# Patient Record
Sex: MALE | Race: BLACK OR AFRICAN AMERICAN | NOT HISPANIC OR LATINO | Employment: FULL TIME | URBAN - NONMETROPOLITAN AREA
[De-identification: names, ages, dates, MRNs, and addresses within clinical notes are randomized per-mention and may not be internally consistent; named-entity substitution may affect disease eponyms.]

---

## 2019-02-05 ENCOUNTER — TRANSCRIBE ORDERS (OUTPATIENT)
Dept: ULTRASOUND IMAGING | Facility: HOSPITAL | Age: 44
End: 2019-02-05

## 2019-02-05 DIAGNOSIS — H53.121 TRANSIENT VISUAL LOSS, RIGHT EYE: Primary | ICD-10-CM

## 2019-02-07 ENCOUNTER — HOSPITAL ENCOUNTER (OUTPATIENT)
Dept: ULTRASOUND IMAGING | Facility: HOSPITAL | Age: 44
Discharge: HOME | End: 2019-02-07
Admitting: FAMILY MEDICINE

## 2019-02-07 DIAGNOSIS — H53.121 TRANSIENT VISUAL LOSS, RIGHT EYE: ICD-10-CM

## 2019-02-07 PROCEDURE — 93880 EXTRACRANIAL BILAT STUDY: CPT

## 2019-02-21 ENCOUNTER — OFFICE VISIT (OUTPATIENT)
Dept: UROLOGY | Facility: CLINIC | Age: 44
End: 2019-02-21

## 2019-02-21 VITALS
BODY MASS INDEX: 30.32 KG/M2 | DIASTOLIC BLOOD PRESSURE: 80 MMHG | HEART RATE: 73 BPM | OXYGEN SATURATION: 94 % | TEMPERATURE: 97.6 F | SYSTOLIC BLOOD PRESSURE: 130 MMHG | WEIGHT: 249 LBS | HEIGHT: 76 IN

## 2019-02-21 DIAGNOSIS — Z30.09 VASECTOMY EVALUATION: Primary | ICD-10-CM

## 2019-02-21 PROCEDURE — 99244 OFF/OP CNSLTJ NEW/EST MOD 40: CPT | Performed by: UROLOGY

## 2019-02-21 RX ORDER — CARVEDILOL PHOSPHATE 40 MG/1
CAPSULE, EXTENDED RELEASE ORAL
COMMUNITY
Start: 2019-01-20

## 2019-02-21 RX ORDER — AMLODIPINE BESYLATE 10 MG/1
TABLET ORAL
COMMUNITY

## 2019-02-21 RX ORDER — LISINOPRIL 20 MG/1
20 TABLET ORAL DAILY
Refills: 0 | COMMUNITY
Start: 2019-02-04

## 2019-02-21 RX ORDER — LORAZEPAM 1 MG/1
2 TABLET ORAL ONCE
Qty: 2 TABLET | Refills: 0 | Status: SHIPPED | OUTPATIENT
Start: 2019-02-21 | End: 2019-02-21

## 2019-02-21 NOTE — PROGRESS NOTES
Chief Complaint  Elective sterilization    HPI  Mr. Xiao is a 43 y.o. male who presents with desire for irreversible, elective sterilization.    He has 5 kids.    His wife is supportive of this decision.    He has been considering this decision for 5 months.    Past Medical History  Past Medical History:   Diagnosis Date   • HTN (hypertension)        Past Surgical History  Past Surgical History:   Procedure Laterality Date   • HAND SURGERY         Medications    Current Outpatient Medications:   •  amLODIPine (NORVASC) 10 MG tablet, amlodipine 10 mg tablet  1 po QDay, Disp: , Rfl:   •  COREG CR 40 MG 24 hr capsule, , Disp: , Rfl:   •  lisinopril (PRINIVIL,ZESTRIL) 20 MG tablet, Take 20 mg by mouth Daily., Disp: , Rfl: 0    Allergies  No Known Allergies    Social History  Social History     Socioeconomic History   • Marital status: Single     Spouse name: Not on file   • Number of children: Not on file   • Years of education: Not on file   • Highest education level: Not on file   Social Needs   • Financial resource strain: Not on file   • Food insecurity - worry: Not on file   • Food insecurity - inability: Not on file   • Transportation needs - medical: Not on file   • Transportation needs - non-medical: Not on file   Occupational History   • Not on file   Tobacco Use   • Smoking status: Never Smoker   • Smokeless tobacco: Never Used   Substance and Sexual Activity   • Alcohol use: Yes   • Drug use: No   • Sexual activity: Not on file   Other Topics Concern   • Not on file   Social History Narrative   • Not on file       Family History  He has no family history of prostate cancer    Review of Systems  Constitutional: Negative for fever, chills, weight loss, weight gain and malaise/fatigue.   Skin: Negative for rash.   Eyes: Negative for blurred vision.   Endocrine: No heat/cold intolerance   Cardiovascular: Negative for chest pain or dyspnea on exertion.  Respiratory: Negative for shortness of breath or wheezing.  "  Gastrointestinal: Negative for nausea, abdominal pain, diarrhea, constipation.   Genitourinary: Negative for new lower urinary tract symptoms, current gross hematuria or dysuria.  Musculoskeletal: Negative for back pain and joint pain.   Lymph/Heme: Negative for easily bruises / bleeds.     Physical Exam  Visit Vitals  /80   Pulse 73   Temp 97.6 °F (36.4 °C)   Ht 193 cm (76\")   Wt 113 kg (249 lb)   SpO2 94%   BMI 30.31 kg/m²     Constitutional: NAD, WDWN.   HEENT: NCAT. Conjunctivae normal.  MMM.    Cardiovascular: Regular rate.  Pulmonary/Chest: Respirations are even and non-labored bilaterally.  Abdominal: Soft. No distension, tenderness, masses or guarding. No CVA tenderness.  Neurological: A + O x 3.  Cranial Nerves II-XII grossly intact. Normal gait.  Extremities: STACEY x 4, Warm. No clubbing.  No cyanosis.    Skin: Pink, warm and dry.  No rashes noted.  Psychiatric:  Normal mood and affect    Genitourinary  Penis: circumcised penis, glans normal, no penile discharge.  No rashes/lesions.    Testes: descended bilaterally, no masses, nontender to palpation. Remainder of scrotal contents normal. No hernia appreciated.  Bilateral vasa palpable    Assessment and Plan  Mr. Xiao is a 43 y.o. male who presents today requesting permanent, irreversible surgical sterilization.  He was instructed to trim his pubic hair the night prior with a clippers.  The vasectomy was discussed in detail with the patient today including the risks which are failure of the procedure, infection, bleeding, development of chronic testicular pain and the possibility of injuring adjacent structures which could potentially result in loss of the testicle.  Furthermore, the patient was told he would remain fertile following the procedure until he provided a semen analysis that showed no sperm.  The patient expressed understanding and desire to proceed.  He will be scheduled for vasectomy at his convenience. He was given 2 mg ativan to take " 1 hr beforehand.     I spent a total of 45 minutes with the patient in face-to-face interaction, with >50% of the time spent in engaging in counseling on the risks, benefits, and alternatives of the therapy and coordinating care.      Bernard Cunningham MD

## 2019-03-08 ENCOUNTER — PROCEDURE VISIT (OUTPATIENT)
Dept: UROLOGY | Facility: CLINIC | Age: 44
End: 2019-03-08

## 2019-03-08 VITALS — WEIGHT: 249 LBS | BODY MASS INDEX: 30.32 KG/M2 | RESPIRATION RATE: 16 BRPM | HEIGHT: 76 IN

## 2019-03-08 DIAGNOSIS — Z30.2 ENCOUNTER FOR VASECTOMY: Primary | ICD-10-CM

## 2019-03-08 PROCEDURE — 55250 REMOVAL OF SPERM DUCT(S): CPT | Performed by: UROLOGY

## 2019-03-08 RX ORDER — OXYCODONE HYDROCHLORIDE AND ACETAMINOPHEN 5; 325 MG/1; MG/1
1-2 TABLET ORAL EVERY 6 HOURS PRN
Qty: 15 TABLET | Refills: 0 | Status: SHIPPED | OUTPATIENT
Start: 2019-03-08

## 2019-03-08 RX ORDER — IBUPROFEN 600 MG/1
600 TABLET ORAL EVERY 6 HOURS
Qty: 30 TABLET | Refills: 0 | Status: SHIPPED | OUTPATIENT
Start: 2019-03-08 | End: 2019-03-10

## 2019-03-08 RX ORDER — DOCUSATE SODIUM 100 MG/1
100 CAPSULE, LIQUID FILLED ORAL 2 TIMES DAILY
Qty: 30 CAPSULE | Refills: 1 | Status: SHIPPED | OUTPATIENT
Start: 2019-03-08

## 2019-03-08 NOTE — PROGRESS NOTES
Preoperative diagnosis  Elective sterilization    Postoperative diagnosis  Elective sterilization    Procedure performed  Bilateral vasectomy    Surgeon  Bernard Cunningham MD    Anesthesia  10 mL lidocaine 2% local injection    Complications  None    EBL  2 mL    Specimen  Left vas  Right vas    Indications  43 y.o. male agreed to undergo the above named procedure after discussion of the alternatives, risks and benefits.  Informed consent was obtained.      Procedure  The patient was brought to the procedure room and placed in supine position.  His scrotum was prepped with Hibiclens and he was draped in a sterile fashion.  A timeout was performed.    Lidocaine 2% was used to anesthetize the scrotal skin as well as perivasal sheaths using a high-pressure jet injector. A 1 cm skin incision was created with the sharp-tip mosquito and a vas clamp utilized through this incision to grasp the vas.  The left vas was elevated to the skin surface and dissected free of its perivasal sheath.  The vas was transected and the lumen was cauterized both proximally and distally.  A 4-0 chromic suture was utilized to place the proximal vasal portion under the perivasal sheath, such that the 2 ends were divided by the perivasal sheath (fascial interposition).  This portion of the vas was then allowed to drop back into the left hemiscrotum.  The right side was treated next in the same manner as described above.  The skin incision was closed with the 4-0 chromic suture.  The patient tolerated the procedure well.    It was reiterated to the patient that he would remain fertile for sometime and should present to the office for a post-vacectomy semen analysis to confirm sterility.  We will check a semen analysis in approximately 2 months. The patient expressed understanding.

## 2019-03-08 NOTE — PATIENT INSTRUCTIONS
Home Care after Vasectomy   Follow these guidelines for your care after your surgery to help your recovery.    Activity  • Limit your activity for the first 5 days after surgery to light activity.  • You may return to work in a day or so.  • Limit lifting, pushing or pulling to less than 20 pounds for the next week. Also limit running and long walks.     Swelling  Scrotal swelling from the surgery may take weeks to get better. You should call your doctor if the swelling is severe and the scrotum is larger than an orange.  • Use ice packs to the scrotum and penis for 15 minutes every hour for the first 48 hours when you are awake to limit swelling. Use a plastic bag with ice or a bag of frozen peas for the ice pack. Wrap a cloth or towel around the ice pack so the ice does not directly touch your skin.  • Wear a jock strap or tight underwear for the next week to support your scrotum and reduce swelling.    Incision care  • Stitches will dissolve and do not need to be removed.   • Expect a small amount of blood may stain the dressings for up to 72 hours after surgery.   • For the first few days, apply two or three gauze pads to the site each day and as needed to keep the dressing dry. This will protect the incision and help keep your clothes clean.  • When you are no longer having any drainage, stop using the gauze pads over the site.    Bathing or showering  • You may shower 48 to 72 hours after surgery.  Allow the water to wash over the incision but do not scrub the incision. Dry the site gently by patting it with a clean towel.   • Tub baths should be avoided for 7 days after surgery.   • Swimming should be avoided for 14 days after surgery.      Pain control  You will likely be sent home with a prescription for a few days of pain medicine.  Use this only as needed.  After 48 hours, most patients can take extra strength Tylenol (acetaminophen) or Advil (ibuprofen) for pain, following the label directions. Pain  most often is eased after 5 to 7 days.  Take the ibuprofen for the first 2 days, scheduled.     Sexual activity  You need to avoid ejaculating for 3 days after surgery.    Follow up  You should have a semen analysis performed in 8-12 weeks after your procedure to confirm sterility.  Use contraceptives until this is confirmed to prevent pregnancy.    When to call your doctor  • Severe swelling, larger than the size of an orange   • A large amount of fluid drainage that soaks several pads per day  • Pain that is not controlled with pain medicine and use of ice packs  • Any signs of infection such as:  ·       Increased redness or tenderness around the incision site  ·       Pus type drainage from the incision  ·       Fever of greater than 101 degrees F    Other Contacts  Urology Office:  [address]   [place], [facility] [phone] office  [phone] fax

## 2019-05-08 ENCOUNTER — RESULTS ENCOUNTER (OUTPATIENT)
Dept: UROLOGY | Facility: CLINIC | Age: 44
End: 2019-05-08

## 2019-05-08 DIAGNOSIS — Z30.2 ENCOUNTER FOR VASECTOMY: ICD-10-CM

## 2019-05-17 ENCOUNTER — LAB (OUTPATIENT)
Dept: LAB | Facility: HOSPITAL | Age: 44
End: 2019-05-17

## 2019-05-17 DIAGNOSIS — Z30.9 PRESENCE OF CONTRACEPTIVE DEVICE: Primary | ICD-10-CM

## 2019-05-17 LAB — SPERM - POST VASECTOMY: NORMAL

## 2019-05-17 PROCEDURE — 89321 SEMEN ANAL SPERM DETECTION: CPT

## 2021-03-18 ENCOUNTER — IMMUNIZATION (OUTPATIENT)
Dept: VACCINE CLINIC | Facility: HOSPITAL | Age: 46
End: 2021-03-18

## 2021-03-18 PROCEDURE — 0001A: CPT | Performed by: INTERNAL MEDICINE

## 2021-03-18 PROCEDURE — 91300 HC SARSCOV02 VAC 30MCG/0.3ML IM: CPT | Performed by: INTERNAL MEDICINE

## 2021-04-08 ENCOUNTER — APPOINTMENT (OUTPATIENT)
Dept: VACCINE CLINIC | Facility: HOSPITAL | Age: 46
End: 2021-04-08

## 2021-04-08 ENCOUNTER — IMMUNIZATION (OUTPATIENT)
Dept: VACCINE CLINIC | Facility: HOSPITAL | Age: 46
End: 2021-04-08

## 2021-04-08 PROCEDURE — 0002A: CPT | Performed by: INTERNAL MEDICINE

## 2021-04-08 PROCEDURE — 91300 HC SARSCOV02 VAC 30MCG/0.3ML IM: CPT | Performed by: INTERNAL MEDICINE

## 2023-01-27 ENCOUNTER — HOSPITAL ENCOUNTER (EMERGENCY)
Facility: HOSPITAL | Age: 48
Discharge: HOME | End: 2023-01-27
Attending: STUDENT IN AN ORGANIZED HEALTH CARE EDUCATION/TRAINING PROGRAM | Admitting: STUDENT IN AN ORGANIZED HEALTH CARE EDUCATION/TRAINING PROGRAM
Payer: OTHER GOVERNMENT

## 2023-01-27 ENCOUNTER — APPOINTMENT (OUTPATIENT)
Dept: GENERAL RADIOLOGY | Facility: HOSPITAL | Age: 48
End: 2023-01-27
Payer: OTHER GOVERNMENT

## 2023-01-27 VITALS
WEIGHT: 250 LBS | OXYGEN SATURATION: 95 % | HEIGHT: 76 IN | BODY MASS INDEX: 30.44 KG/M2 | TEMPERATURE: 97.8 F | SYSTOLIC BLOOD PRESSURE: 125 MMHG | DIASTOLIC BLOOD PRESSURE: 84 MMHG | RESPIRATION RATE: 16 BRPM | HEART RATE: 90 BPM

## 2023-01-27 DIAGNOSIS — H83.02 LABYRINTHITIS OF LEFT EAR: Primary | ICD-10-CM

## 2023-01-27 LAB
ALBUMIN SERPL-MCNC: 3.7 G/DL (ref 3.5–5.2)
ALBUMIN/GLOB SERPL: 1.3 G/DL
ALP SERPL-CCNC: 82 U/L (ref 39–117)
ALT SERPL W P-5'-P-CCNC: 22 U/L (ref 1–41)
ANION GAP SERPL CALCULATED.3IONS-SCNC: 9.6 MMOL/L (ref 5–15)
AST SERPL-CCNC: 19 U/L (ref 1–40)
BASOPHILS # BLD AUTO: 0.07 10*3/MM3 (ref 0–0.2)
BASOPHILS NFR BLD AUTO: 0.7 % (ref 0–1.5)
BILIRUB SERPL-MCNC: 0.5 MG/DL (ref 0–1.2)
BUN SERPL-MCNC: 21 MG/DL (ref 6–20)
BUN/CREAT SERPL: 15.9 (ref 7–25)
CALCIUM SPEC-SCNC: 9.2 MG/DL (ref 8.6–10.5)
CHLORIDE SERPL-SCNC: 109 MMOL/L (ref 98–107)
CO2 SERPL-SCNC: 26.4 MMOL/L (ref 22–29)
CREAT SERPL-MCNC: 1.32 MG/DL (ref 0.76–1.27)
CRP SERPL-MCNC: 0.87 MG/DL (ref 0–0.5)
DEPRECATED RDW RBC AUTO: 48.3 FL (ref 37–54)
EGFRCR SERPLBLD CKD-EPI 2021: 67.4 ML/MIN/1.73
EOSINOPHIL # BLD AUTO: 0.04 10*3/MM3 (ref 0–0.4)
EOSINOPHIL NFR BLD AUTO: 0.4 % (ref 0.3–6.2)
ERYTHROCYTE [DISTWIDTH] IN BLOOD BY AUTOMATED COUNT: 14.2 % (ref 12.3–15.4)
FLUAV RNA RESP QL NAA+PROBE: NOT DETECTED
FLUBV RNA RESP QL NAA+PROBE: NOT DETECTED
GLOBULIN UR ELPH-MCNC: 2.9 GM/DL
GLUCOSE SERPL-MCNC: 100 MG/DL (ref 65–99)
HCT VFR BLD AUTO: 44.2 % (ref 37.5–51)
HGB BLD-MCNC: 14.6 G/DL (ref 13–17.7)
IMM GRANULOCYTES # BLD AUTO: 0.03 10*3/MM3 (ref 0–0.05)
IMM GRANULOCYTES NFR BLD AUTO: 0.3 % (ref 0–0.5)
LYMPHOCYTES # BLD AUTO: 1.22 10*3/MM3 (ref 0.7–3.1)
LYMPHOCYTES NFR BLD AUTO: 12.8 % (ref 19.6–45.3)
MCH RBC QN AUTO: 30.4 PG (ref 26.6–33)
MCHC RBC AUTO-ENTMCNC: 33 G/DL (ref 31.5–35.7)
MCV RBC AUTO: 91.9 FL (ref 79–97)
MONOCYTES # BLD AUTO: 0.5 10*3/MM3 (ref 0.1–0.9)
MONOCYTES NFR BLD AUTO: 5.2 % (ref 5–12)
NEUTROPHILS NFR BLD AUTO: 7.67 10*3/MM3 (ref 1.7–7)
NEUTROPHILS NFR BLD AUTO: 80.6 % (ref 42.7–76)
NRBC BLD AUTO-RTO: 0 /100 WBC (ref 0–0.2)
PLATELET # BLD AUTO: 244 10*3/MM3 (ref 140–450)
PMV BLD AUTO: 11.7 FL (ref 6–12)
POTASSIUM SERPL-SCNC: 3.7 MMOL/L (ref 3.5–5.2)
PROT SERPL-MCNC: 6.6 G/DL (ref 6–8.5)
RBC # BLD AUTO: 4.81 10*6/MM3 (ref 4.14–5.8)
SARS-COV-2 RNA RESP QL NAA+PROBE: NOT DETECTED
SODIUM SERPL-SCNC: 145 MMOL/L (ref 136–145)
WBC NRBC COR # BLD: 9.53 10*3/MM3 (ref 3.4–10.8)

## 2023-01-27 PROCEDURE — 25010000002 DIAZEPAM PER 5 MG: Performed by: STUDENT IN AN ORGANIZED HEALTH CARE EDUCATION/TRAINING PROGRAM

## 2023-01-27 PROCEDURE — 71046 X-RAY EXAM CHEST 2 VIEWS: CPT

## 2023-01-27 PROCEDURE — 99284 EMERGENCY DEPT VISIT MOD MDM: CPT

## 2023-01-27 PROCEDURE — 36415 COLL VENOUS BLD VENIPUNCTURE: CPT

## 2023-01-27 PROCEDURE — 80053 COMPREHEN METABOLIC PANEL: CPT | Performed by: STUDENT IN AN ORGANIZED HEALTH CARE EDUCATION/TRAINING PROGRAM

## 2023-01-27 PROCEDURE — 87636 SARSCOV2 & INF A&B AMP PRB: CPT | Performed by: STUDENT IN AN ORGANIZED HEALTH CARE EDUCATION/TRAINING PROGRAM

## 2023-01-27 PROCEDURE — 85025 COMPLETE CBC W/AUTO DIFF WBC: CPT | Performed by: STUDENT IN AN ORGANIZED HEALTH CARE EDUCATION/TRAINING PROGRAM

## 2023-01-27 PROCEDURE — 96374 THER/PROPH/DIAG INJ IV PUSH: CPT

## 2023-01-27 PROCEDURE — 93005 ELECTROCARDIOGRAM TRACING: CPT | Performed by: STUDENT IN AN ORGANIZED HEALTH CARE EDUCATION/TRAINING PROGRAM

## 2023-01-27 PROCEDURE — 86140 C-REACTIVE PROTEIN: CPT | Performed by: STUDENT IN AN ORGANIZED HEALTH CARE EDUCATION/TRAINING PROGRAM

## 2023-01-27 RX ORDER — SCOLOPAMINE TRANSDERMAL SYSTEM 1 MG/1
1 PATCH, EXTENDED RELEASE TRANSDERMAL
Qty: 4 EACH | Refills: 0 | Status: SHIPPED | OUTPATIENT
Start: 2023-01-27

## 2023-01-27 RX ORDER — MECLIZINE HYDROCHLORIDE 25 MG/1
25 TABLET ORAL ONCE
Status: COMPLETED | OUTPATIENT
Start: 2023-01-27 | End: 2023-01-27

## 2023-01-27 RX ORDER — DIAZEPAM 5 MG/ML
5 INJECTION, SOLUTION INTRAMUSCULAR; INTRAVENOUS ONCE
Status: COMPLETED | OUTPATIENT
Start: 2023-01-27 | End: 2023-01-27

## 2023-01-27 RX ORDER — MECLIZINE HYDROCHLORIDE 25 MG/1
25 TABLET ORAL 3 TIMES DAILY PRN
Qty: 30 TABLET | Refills: 0 | Status: SHIPPED | OUTPATIENT
Start: 2023-01-27

## 2023-01-27 RX ORDER — DIAZEPAM 5 MG/1
5 TABLET ORAL EVERY 8 HOURS PRN
Qty: 10 TABLET | Refills: 0 | Status: SHIPPED | OUTPATIENT
Start: 2023-01-27

## 2023-01-27 RX ADMIN — MECLIZINE HYDROCHLORIDE 25 MG: 25 TABLET ORAL at 17:57

## 2023-01-27 RX ADMIN — DIAZEPAM 5 MG: 5 INJECTION, SOLUTION INTRAMUSCULAR; INTRAVENOUS at 17:58

## 2023-02-02 ENCOUNTER — TELEPHONE (OUTPATIENT)
Dept: FAMILY MEDICINE CLINIC | Facility: CLINIC | Age: 48
End: 2023-02-02
Payer: OTHER GOVERNMENT

## 2023-02-02 NOTE — TELEPHONE ENCOUNTER
Per call from [facility], she stated that this patient had an emergent issue and they were attempting to refer him to Dr Garcia at [facility] for stroke and brain bleed. She states that Dr Gomez is listed as patient PCP with [facility] and they called to try to get it changed to Dr Chacon and were told that they could not be found as a  PCP in the system. Due to the urgent nature of the referral, I did go ahead and process the authorization to Dr Garcia, but advised them that for any future referral authorizations, patient would need to establish care with Dr Gomez if they are unable to change the PCP designation to their provider. Voiced understanding and said she would advise the patient.

## 2023-10-30 ENCOUNTER — OFFICE VISIT (OUTPATIENT)
Dept: NEUROLOGY | Facility: CLINIC | Age: 48
End: 2023-10-30
Payer: OTHER GOVERNMENT

## 2023-10-30 VITALS
HEIGHT: 76 IN | HEART RATE: 58 BPM | WEIGHT: 261 LBS | OXYGEN SATURATION: 99 % | DIASTOLIC BLOOD PRESSURE: 76 MMHG | BODY MASS INDEX: 31.78 KG/M2 | SYSTOLIC BLOOD PRESSURE: 124 MMHG

## 2023-10-30 DIAGNOSIS — I63.433 CEREBROVASCULAR ACCIDENT (CVA) DUE TO BILATERAL EMBOLISM OF POSTERIOR CEREBRAL ARTERIES: Primary | ICD-10-CM

## 2023-10-30 PROCEDURE — 99214 OFFICE O/P EST MOD 30 MIN: CPT | Performed by: PSYCHIATRY & NEUROLOGY

## 2023-10-30 NOTE — PROGRESS NOTES
Subjective:    CC: Christiano Xiao is seen today for Cerebrovascular Accident       Current visit-patient denies having any new strokelike symptoms.  He continues to take aspirin 81 mg daily and also started Lipitor 20 mg daily.  His last lipid panel was as follows-, , , HDL 51.  A1c was 5.4.  He had his echocardiogram that showed an EF of 52% with grade 1 diastolic dysfunction but normal left atrial size and no PFO.  At his last visit I had tried to contact his previous cardiologist regarding anticoagulation however we did not get any response.  Patient also forgot to call them.    Initial odiey-55-adbb-old -American male with a history of hypertension, A-fib in the past presents with strokes.  As per patient he started to have vertigo along with tunnel vision in January this year.  He saw his PCP who ordered a MRI brain for him that showed subacute bilateral embolic strokes in the left cerebellum and right occipital region as well as a carotid Doppler that showed minimal plaques bilaterally with no significant stenosis as well as a 16mm thyroid nodule for which she had further work-up later.  Patient states that he has a longstanding history of atrial fibrillation for which she was started on Coreg by his cardiologist Dr. Guerrero.  Has never been on any blood thinners.  He is currently on several different blood pressure medications but his blood pressure still fluctuates a lot (at times it is too high or too low).  He denies having any dizziness or vision problems now.  He does vape.  Of note-I reviewed his test results and his PCPs notes    The following portions of the patient's history were reviewed today and updated as of 06/21/2023  : allergies, current medications, past family history, past medical history, past social history, past surgical history, and problem list  These document will be scanned to patient's chart.      Current Outpatient Medications:     amLODIPine  "(NORVASC) 10 MG tablet, amlodipine 10 mg tablet  1 po QDay, Disp: , Rfl:     atorvastatin (Lipitor) 20 MG tablet, Take 1 tablet by mouth Daily., Disp: 30 tablet, Rfl: 11    COREG CR 40 MG 24 hr capsule, , Disp: , Rfl:     docusate sodium (COLACE) 100 MG capsule, Take 1 capsule by mouth 2 (Two) Times a Day. Take if taking the percocet, Disp: 30 capsule, Rfl: 1    lisinopril (PRINIVIL,ZESTRIL) 20 MG tablet, Take 1 tablet by mouth Daily., Disp: , Rfl: 0   Past Medical History:   Diagnosis Date    Atrial fibrillation     HTN (hypertension)     Stroke 02/2023      Past Surgical History:   Procedure Laterality Date    HAND SURGERY        Family History   Problem Relation Age of Onset    Hypertension Father     Stroke Father     Cancer Mother       Social History     Socioeconomic History    Marital status: Single   Tobacco Use    Smoking status: Some Days     Types: Electronic Cigarette     Passive exposure: Yes    Smokeless tobacco: Never   Substance and Sexual Activity    Alcohol use: Yes     Alcohol/week: 3.0 standard drinks of alcohol     Types: 3 Cans of beer per week    Drug use: No    Sexual activity: Yes     Partners: Female     Birth control/protection: Vasectomy     Review of Systems   All other systems reviewed and are negative.      Objective:    /76   Pulse 58   Ht 193 cm (76\")   Wt 118 kg (261 lb)   SpO2 99%   BMI 31.77 kg/m²     Neurology Exam:    General apperance: NAD.     Mental status: Alert, awake and oriented to time place and person.    Recent and Remote memory: Intact.    Attention span and Concentration: Normal.     Language and Speech: Intact- No dysarthria.    Fluency, Naming , Repitition and Comprehension:  Intact    Cranial Nerves:   CN II: Visual fields are full. Intact. Fundi - Normal, No papillederma, Pupils - MARTHA  CN III, IV and VI: Extraocular movements are intact. Normal saccades.   CN V: Facial sensation is intact.   CN VII: Muscles of facial expression reveal no asymmetry. " Intact.   CN VIII: Hearing is intact. Whispered voice intact.   CN IX and X: Palate elevates symmetrically. Intact  CN XI: Shoulder shrug is intact.   CN XII: Tongue is midline without evidence of atrophy or fasciculation.     Ophthalmoscopic exam of optic disc-normal    Motor:  Right UE muscle strength 5/5. Normal tone.     Left UE muscle strength 5/5. Normal tone.      Right LE muscle strength5/5. Normal tone.     Left LE muscle strength 5/5. Normal tone.      Sensory: Normal light touch, vibration and pinprick sensation bilaterally.    DTRs: 2+ bilaterally in upper and lower extremities.    Babinski: Negative bilaterally.    Co-ordination: Normal finger-to-nose, heel to shin B/L.    Rhomberg: Negative.    Gait: Normal.  Could do tandem walking    Cardiovascular: Regular rate and rhythm without murmur, gallop or rub.    Assessment and Plan:  1. Cerebrovascular accident (CVA) due to bilateral embolism of posterior cerebral arteries  Patient had bilateral posterior circulation strokes most likely cardioembolic in nature.  He does have a history of paroxysmal atrial fibrillation in the past  I will refer him to cardiology to get a loop recorder placement  Till then he can continue aspirin 81 mg and Lipitor 20 mg daily.  Last LDL was 133  Goal blood pressure less than 130/90.   I had also counseled him to stop vaping         Return in about 9 months (around 7/30/2024).     I spent over 22 minutes with the patient face to face out of which over 50% (20 minutes) was spent in management, instructions and education.     Suly Garcia MD

## 2023-11-09 ENCOUNTER — TRANSCRIBE ORDERS (OUTPATIENT)
Dept: CARDIOLOGY | Facility: CLINIC | Age: 48
End: 2023-11-09

## 2023-11-09 ENCOUNTER — OFFICE VISIT (OUTPATIENT)
Dept: CARDIOLOGY | Facility: CLINIC | Age: 48
End: 2023-11-09
Payer: MEDICAID

## 2023-11-09 VITALS
HEART RATE: 77 BPM | BODY MASS INDEX: 31.78 KG/M2 | WEIGHT: 261 LBS | SYSTOLIC BLOOD PRESSURE: 128 MMHG | DIASTOLIC BLOOD PRESSURE: 70 MMHG | HEIGHT: 76 IN | OXYGEN SATURATION: 99 %

## 2023-11-09 DIAGNOSIS — I63.10 CEREBROVASCULAR ACCIDENT (CVA) DUE TO EMBOLISM OF PRECEREBRAL ARTERY: ICD-10-CM

## 2023-11-09 DIAGNOSIS — I48.0 PAROXYSMAL ATRIAL FIBRILLATION: ICD-10-CM

## 2023-11-09 DIAGNOSIS — I10 PRIMARY HYPERTENSION: ICD-10-CM

## 2023-11-09 DIAGNOSIS — I48.0 PAF (PAROXYSMAL ATRIAL FIBRILLATION): Primary | ICD-10-CM

## 2023-11-09 DIAGNOSIS — I63.10 CEREBRAL INFARCTION DUE TO EMBOLISM OF PRECEREBRAL ARTERY: Primary | ICD-10-CM

## 2023-11-09 PROBLEM — I63.9 CVA (CEREBRAL VASCULAR ACCIDENT): Status: ACTIVE | Noted: 2023-11-09

## 2023-11-09 NOTE — PROGRESS NOTES
OFFICE VISIT  NOTE  [facility] CARDIOLOGY      Name: Christiano Xiao    Date: 2023  MRN:  [id number]  :  1975      REFERRING/PRIMARY PROVIDER:  Christin Chacon MD    Chief Complaint   Patient presents with    Advice Only     LOOP       HPI: Christiano Xiao is a 48 y.o. male who presents today for history of CVA.  2023 vertigo and tunnel vision, MRI showed bilateral embolic strokes left cerebellum and right occipital region carotid duplex showed minimal plaque bilaterally.  He reports a history of paroxysmal atrial fibrillation previously followed by Dr. Guerrero.  Not on anticoagulation.  Follows with Dr. Garcia of neurology, who recommends loop recorder plantation.  Echo 7/3/2023 showed EF 50 to 55% with mild basal asymmetric hypertrophy, grade 1 diastolic dysfunction and negative saline study.  Overall doing well, no residual effects    Past Medical History:   Diagnosis Date    Atrial fibrillation     CVA (cerebral vascular accident) 2023    HTN (hypertension) 2023    Stroke 2023       Past Surgical History:   Procedure Laterality Date    HAND SURGERY         Social History     Socioeconomic History    Marital status: Single   Tobacco Use    Smoking status: Some Days     Types: Electronic Cigarette     Passive exposure: Yes    Smokeless tobacco: Never   Vaping Use    Vaping Use: Some days   Substance and Sexual Activity    Alcohol use: Yes     Alcohol/week: 3.0 standard drinks of alcohol     Types: 3 Cans of beer per week     Comment: SOCIAL    Drug use: No    Sexual activity: Yes     Partners: Female     Birth control/protection: Vasectomy       Family History   Problem Relation Age of Onset    Cancer Mother     Hypertension Father     Stroke Father         ROS:   Constitutional no fever,  no weight loss   Skin no rash, no subcutaneous nodules   Otolaryngeal no difficulty swallowing   Cardiovascular See HPI   Pulmonary no cough, no sputum production  "  Gastrointestinal no constipation, no diarrhea   Genitourinary no dysuria, no hematuria   Hematologic no easy bruisability, no abnormal bleeding   Musculoskeletal no muscle pain   Neurologic no dizziness, no falls         No Known Allergies      Current Outpatient Medications:     amLODIPine (NORVASC) 10 MG tablet, amlodipine 10 mg tablet  1 po QDay, Disp: , Rfl:     atorvastatin (Lipitor) 20 MG tablet, Take 1 tablet by mouth Daily., Disp: 30 tablet, Rfl: 11    COREG CR 40 MG 24 hr capsule, , Disp: , Rfl:     docusate sodium (COLACE) 100 MG capsule, Take 1 capsule by mouth 2 (Two) Times a Day. Take if taking the percocet, Disp: 30 capsule, Rfl: 1    lisinopril (PRINIVIL,ZESTRIL) 20 MG tablet, Take 1 tablet by mouth Daily., Disp: , Rfl: 0    Vitals:    11/09/23 1325   BP: 128/70   BP Location: Right arm   Patient Position: Sitting   Cuff Size: Adult   Pulse: 77   SpO2: 99%   Weight: 118 kg (261 lb)   Height: 193 cm (76\")     Body mass index is 31.77 kg/m².    PHYSICAL EXAM:    General Appearance:   well developed  well nourished  HENT:   oropharynx moist  lips not cyanotic  Neck:  thyroid not enlarged  supple  Respiratory:  no respiratory distress  normal breath sounds  no rales  Cardiovascular:  no jugular venous distention  regular rhythm  apical impulse normal  S1 normal, S2 normal  no S3, no S4   no murmur  no rub, no thrill  carotid pulses normal; no bruit  lower extremity edema: none      Musculoskeletal:  no clubbing of fingers.   normocephalic, head atraumatic  Skin:   warm, dry  Psychiatric:  judgement and insight appropriate  normal mood and affect    RESULTS:     ECG 12 Lead    Date/Time: 11/9/2023 3:32 PM  Performed by: Omar Meredith MD    Authorized by: Omar Meredith MD  Comparison: compared with previous ECG from 1/27/2023  Similar to previous ECG  Rhythm: sinus rhythm  Rate: normal  BPM: 72  QRS axis: normal    Clinical impression: non-specific ECG  Comments: Complete left bundle branch " "block.          Results for orders placed during the hospital encounter of 07/03/23    Adult Transthoracic Echo Complete W/ Cont if Necessary Per Protocol    Interpretation Summary    Left ventricular systolic function is normal. Estimated left ventricular EF = 52% Left ventricular ejection fraction appears to be 51 - 55%.    Left ventricular wall thickness is consistent with mild basal asymmetric hypertrophy.    Left ventricular diastolic function is consistent with (grade I) impaired relaxation.    Saline test results are negative.        Labs:  Lab Results   Component Value Date    CHOL 205 (H) 06/21/2023    TRIG 119 06/21/2023    HDL 51 06/21/2023     (H) 06/21/2023    AST 19 01/27/2023    ALT 22 01/27/2023     Lab Results   Component Value Date    HGBA1C 5.40 06/21/2023     No components found for: \"CREATINININE\"  No results found for: \"EGFRIFNONA\"    Most recent PCP note, imaging tests, and labs reviewed.    ASSESSMENT:  Problem List Items Addressed This Visit       PAF (paroxysmal atrial fibrillation) - Primary    HTN (hypertension)    CVA (cerebral vascular accident)    Overview     1/2023            PLAN:    1.  Bilateral CVA:  Thought to be cardioembolic per neurology, recommending loop recorder implantation, we will schedule it.  Continue aspirin and statin  Discussed pros and cons of starting oral anticoagulation he is agreeable we will start Xarelto 20 mg daily.    2.  Hypertension:  Goal blood pressure less than 130/80  Well-controlled on current regimen, continue for now.  Low-sodium diet and exercise also recommended.    3.  Hyperlipidemia:  Continue high intensity statin therapy with follow-up CMP and lipid      Return to clinic in 6 months, or sooner as needed.    Thank you for the opportunity to share in the care of your patient; please do not hesitate to call me with any questions.     Omar Meredith MD, [facility], [facility]  Office: [phone]  [address] [place] [address], " [place] [id number]    11/09/23

## 2023-11-09 NOTE — H&P (VIEW-ONLY)
OFFICE VISIT  NOTE  [facility] CARDIOLOGY      Name: Christiano Xiao    Date: 2023  MRN:  [id number]  :  1975      REFERRING/PRIMARY PROVIDER:  Christin Chacon MD    Chief Complaint   Patient presents with    Advice Only     LOOP       HPI: Christiano Xiao is a 48 y.o. male who presents today for history of CVA.  2023 vertigo and tunnel vision, MRI showed bilateral embolic strokes left cerebellum and right occipital region carotid duplex showed minimal plaque bilaterally.  He reports a history of paroxysmal atrial fibrillation previously followed by Dr. Guerrero.  Not on anticoagulation.  Follows with Dr. Garcia of neurology, who recommends loop recorder plantation.  Echo 7/3/2023 showed EF 50 to 55% with mild basal asymmetric hypertrophy, grade 1 diastolic dysfunction and negative saline study.  Overall doing well, no residual effects    Past Medical History:   Diagnosis Date    Atrial fibrillation     CVA (cerebral vascular accident) 2023    HTN (hypertension) 2023    Stroke 2023       Past Surgical History:   Procedure Laterality Date    HAND SURGERY         Social History     Socioeconomic History    Marital status: Single   Tobacco Use    Smoking status: Some Days     Types: Electronic Cigarette     Passive exposure: Yes    Smokeless tobacco: Never   Vaping Use    Vaping Use: Some days   Substance and Sexual Activity    Alcohol use: Yes     Alcohol/week: 3.0 standard drinks of alcohol     Types: 3 Cans of beer per week     Comment: SOCIAL    Drug use: No    Sexual activity: Yes     Partners: Female     Birth control/protection: Vasectomy       Family History   Problem Relation Age of Onset    Cancer Mother     Hypertension Father     Stroke Father         ROS:   Constitutional no fever,  no weight loss   Skin no rash, no subcutaneous nodules   Otolaryngeal no difficulty swallowing   Cardiovascular See HPI   Pulmonary no cough, no sputum production  "  Gastrointestinal no constipation, no diarrhea   Genitourinary no dysuria, no hematuria   Hematologic no easy bruisability, no abnormal bleeding   Musculoskeletal no muscle pain   Neurologic no dizziness, no falls         No Known Allergies      Current Outpatient Medications:     amLODIPine (NORVASC) 10 MG tablet, amlodipine 10 mg tablet  1 po QDay, Disp: , Rfl:     atorvastatin (Lipitor) 20 MG tablet, Take 1 tablet by mouth Daily., Disp: 30 tablet, Rfl: 11    COREG CR 40 MG 24 hr capsule, , Disp: , Rfl:     docusate sodium (COLACE) 100 MG capsule, Take 1 capsule by mouth 2 (Two) Times a Day. Take if taking the percocet, Disp: 30 capsule, Rfl: 1    lisinopril (PRINIVIL,ZESTRIL) 20 MG tablet, Take 1 tablet by mouth Daily., Disp: , Rfl: 0    Vitals:    11/09/23 1325   BP: 128/70   BP Location: Right arm   Patient Position: Sitting   Cuff Size: Adult   Pulse: 77   SpO2: 99%   Weight: 118 kg (261 lb)   Height: 193 cm (76\")     Body mass index is 31.77 kg/m².    PHYSICAL EXAM:    General Appearance:   well developed  well nourished  HENT:   oropharynx moist  lips not cyanotic  Neck:  thyroid not enlarged  supple  Respiratory:  no respiratory distress  normal breath sounds  no rales  Cardiovascular:  no jugular venous distention  regular rhythm  apical impulse normal  S1 normal, S2 normal  no S3, no S4   no murmur  no rub, no thrill  carotid pulses normal; no bruit  lower extremity edema: none      Musculoskeletal:  no clubbing of fingers.   normocephalic, head atraumatic  Skin:   warm, dry  Psychiatric:  judgement and insight appropriate  normal mood and affect    RESULTS:     ECG 12 Lead    Date/Time: 11/9/2023 3:32 PM  Performed by: Omar Meredith MD    Authorized by: Omar Meredith MD  Comparison: compared with previous ECG from 1/27/2023  Similar to previous ECG  Rhythm: sinus rhythm  Rate: normal  BPM: 72  QRS axis: normal    Clinical impression: non-specific ECG  Comments: Complete left bundle branch " "block.          Results for orders placed during the hospital encounter of 07/03/23    Adult Transthoracic Echo Complete W/ Cont if Necessary Per Protocol    Interpretation Summary    Left ventricular systolic function is normal. Estimated left ventricular EF = 52% Left ventricular ejection fraction appears to be 51 - 55%.    Left ventricular wall thickness is consistent with mild basal asymmetric hypertrophy.    Left ventricular diastolic function is consistent with (grade I) impaired relaxation.    Saline test results are negative.        Labs:  Lab Results   Component Value Date    CHOL 205 (H) 06/21/2023    TRIG 119 06/21/2023    HDL 51 06/21/2023     (H) 06/21/2023    AST 19 01/27/2023    ALT 22 01/27/2023     Lab Results   Component Value Date    HGBA1C 5.40 06/21/2023     No components found for: \"CREATINININE\"  No results found for: \"EGFRIFNONA\"    Most recent PCP note, imaging tests, and labs reviewed.    ASSESSMENT:  Problem List Items Addressed This Visit       PAF (paroxysmal atrial fibrillation) - Primary    HTN (hypertension)    CVA (cerebral vascular accident)    Overview     1/2023            PLAN:    1.  Bilateral CVA:  Thought to be cardioembolic per neurology, recommending loop recorder implantation, we will schedule it.  Continue aspirin and statin  Discussed pros and cons of starting oral anticoagulation he is agreeable we will start Xarelto 20 mg daily.    2.  Hypertension:  Goal blood pressure less than 130/80  Well-controlled on current regimen, continue for now.  Low-sodium diet and exercise also recommended.    3.  Hyperlipidemia:  Continue high intensity statin therapy with follow-up CMP and lipid      Return to clinic in 6 months, or sooner as needed.    Thank you for the opportunity to share in the care of your patient; please do not hesitate to call me with any questions.     Omar Meredith MD, [facility], [facility]  Office: [phone]  [address] [place] [address], " [place] [address]    11/09/23

## 2023-11-14 ENCOUNTER — PREP FOR SURGERY (OUTPATIENT)
Dept: OTHER | Facility: HOSPITAL | Age: 48
End: 2023-11-14
Payer: OTHER GOVERNMENT

## 2023-11-14 RX ORDER — SODIUM CHLORIDE 0.9 % (FLUSH) 0.9 %
10 SYRINGE (ML) INJECTION AS NEEDED
Status: CANCELLED | OUTPATIENT
Start: 2023-11-14

## 2023-11-14 RX ORDER — SODIUM CHLORIDE 9 MG/ML
40 INJECTION, SOLUTION INTRAVENOUS AS NEEDED
Status: CANCELLED | OUTPATIENT
Start: 2023-11-14

## 2023-11-14 RX ORDER — SODIUM CHLORIDE 0.9 % (FLUSH) 0.9 %
10 SYRINGE (ML) INJECTION EVERY 12 HOURS SCHEDULED
Status: CANCELLED | OUTPATIENT
Start: 2023-11-14

## 2023-11-14 RX ORDER — CEFAZOLIN SODIUM 2 G/100ML
2000 INJECTION, SOLUTION INTRAVENOUS ONCE
Status: CANCELLED | OUTPATIENT
Start: 2023-11-14 | End: 2023-11-14

## 2023-11-15 ENCOUNTER — HOSPITAL ENCOUNTER (OUTPATIENT)
Dept: CARDIOLOGY | Facility: HOSPITAL | Age: 48
Discharge: HOME | End: 2023-11-15
Attending: INTERNAL MEDICINE | Admitting: PHYSICIAN ASSISTANT
Payer: OTHER GOVERNMENT

## 2023-11-15 VITALS
OXYGEN SATURATION: 95 % | SYSTOLIC BLOOD PRESSURE: 137 MMHG | TEMPERATURE: 97.1 F | DIASTOLIC BLOOD PRESSURE: 88 MMHG | HEART RATE: 66 BPM

## 2023-11-15 DIAGNOSIS — I63.10 CEREBRAL INFARCTION DUE TO EMBOLISM OF PRECEREBRAL ARTERY: ICD-10-CM

## 2023-11-15 DIAGNOSIS — I48.0 PAROXYSMAL ATRIAL FIBRILLATION: ICD-10-CM

## 2023-11-15 PROCEDURE — 33285 INSJ SUBQ CAR RHYTHM MNTR: CPT

## 2023-11-15 PROCEDURE — C1764 EVENT RECORDER, CARDIAC: HCPCS

## 2023-11-15 PROCEDURE — 25010000002 CEFAZOLIN PER 500 MG: Performed by: PHYSICIAN ASSISTANT

## 2023-11-15 RX ORDER — SODIUM CHLORIDE 0.9 % (FLUSH) 0.9 %
10 SYRINGE (ML) INJECTION EVERY 12 HOURS SCHEDULED
Status: DISCONTINUED | OUTPATIENT
Start: 2023-11-15 | End: 2023-11-15 | Stop reason: HOSPADM

## 2023-11-15 RX ORDER — SODIUM CHLORIDE 9 MG/ML
40 INJECTION, SOLUTION INTRAVENOUS AS NEEDED
Status: DISCONTINUED | OUTPATIENT
Start: 2023-11-15 | End: 2023-11-15 | Stop reason: HOSPADM

## 2023-11-15 RX ORDER — SODIUM CHLORIDE 0.9 % (FLUSH) 0.9 %
10 SYRINGE (ML) INJECTION AS NEEDED
Status: DISCONTINUED | OUTPATIENT
Start: 2023-11-15 | End: 2023-11-15 | Stop reason: HOSPADM

## 2023-11-15 RX ADMIN — SODIUM CHLORIDE 2000 MG: 900 INJECTION INTRAVENOUS at 10:32

## 2023-11-15 NOTE — INTERVAL H&P NOTE
H&P reviewed. The patient was examined and there are no changes to the H&P.      Blood pressure 124/85  Oxygenation 99% on room air  Heart rate 64    Patient is here today for loop recorder implant.  In January 2023, MRI showed bilateral embolic strokes in the left cerebellum and right occipital region with no residual side effects.  Patient reports he has a history of atrial fibrillation and was recently started on Xarelto on 11/9/23. He took one dose and then held it for the procedure today. Last dose Sunday. Procedure, risks, and benefits have been discussed with the patient and he is agreeable to proceed.  Patient is receiving cefazolin 2 g prior to loop implantation.  Further recommendations to follow.    Electronically signed by TIEN Londono, 11/15/23, 9:33 AM EST.    Agree with above documentation. Pt understands all risks, potential benefits and potential alternatives to the loop recorder implantation, but is agreeable to proceed.     Omar Meredith MD, FAC, [facility]  Interventional Cardiology  11/15/23  11:45 EST

## 2023-11-22 ENCOUNTER — OFFICE VISIT (OUTPATIENT)
Dept: CARDIOLOGY | Facility: CLINIC | Age: 48
End: 2023-11-22
Payer: OTHER GOVERNMENT

## 2023-11-22 DIAGNOSIS — I48.0 PAROXYSMAL ATRIAL FIBRILLATION: Primary | ICD-10-CM

## 2023-11-22 PROCEDURE — 99024 POSTOP FOLLOW-UP VISIT: CPT

## 2023-11-22 NOTE — PROGRESS NOTES
2023    Christiano Xiao, : 1975      Fever: No    Temperature if indicated:     Wound Location: Left Infraclavicular    Dressing Removed: Removed by MA/RN      Old Dressing Appearance:  Clean, dry    Wound Appearance: Redness []                  Drainage []                  Culture obtained []        Color: Clear     Consistency:        Amount: none         Gloves used, wound cleansed with sterile 4x4 and peroxide [x]       MD notified []     MD orders:     Antibiotic started []      If checked, type     Other:       Appointment for follow-up scheduled for 3 months post procedure [x]    Future Appointments   Date Time Provider Department Center   2023  9:00 AM WOUND CHECK [facility]   2024  1:00 PM Suly Garcia MD [facility]   2024  1:15 PM Omar Meredith MD [facility]           Priscilla Palacio MA, 23      MD Signature:______________________________ Completed By/Date:

## 2024-10-16 ENCOUNTER — TELEPHONE (OUTPATIENT)
Dept: CARDIOLOGY | Facility: CLINIC | Age: 49
End: 2024-10-16
Payer: OTHER GOVERNMENT

## 2024-10-16 NOTE — TELEPHONE ENCOUNTER
"    Name: Christiano Xiao \"Ladarius\"    Relationship: Self    Best Callback Number: [phone]    Incoming call to the Hub, requesting to  Reschedule their Device Check appointment on 9/5/24.     Per Hub workflow, further review is needed before the task can be completed.    Result of Call: Please reach out to the patient to reschedule      "

## 2024-11-07 ENCOUNTER — OFFICE VISIT (OUTPATIENT)
Dept: CARDIOLOGY | Facility: CLINIC | Age: 49
End: 2024-11-07
Payer: OTHER GOVERNMENT

## 2024-11-07 VITALS
BODY MASS INDEX: 31.42 KG/M2 | HEART RATE: 56 BPM | SYSTOLIC BLOOD PRESSURE: 136 MMHG | DIASTOLIC BLOOD PRESSURE: 74 MMHG | WEIGHT: 258 LBS | OXYGEN SATURATION: 99 % | HEIGHT: 76 IN

## 2024-11-07 DIAGNOSIS — I10 PRIMARY HYPERTENSION: ICD-10-CM

## 2024-11-07 DIAGNOSIS — I48.0 PAF (PAROXYSMAL ATRIAL FIBRILLATION): Primary | ICD-10-CM

## 2024-11-07 PROCEDURE — 99214 OFFICE O/P EST MOD 30 MIN: CPT | Performed by: INTERNAL MEDICINE

## 2024-11-07 RX ORDER — ATORVASTATIN CALCIUM 20 MG/1
1 TABLET, FILM COATED ORAL DAILY
COMMUNITY

## 2024-11-07 RX ORDER — PRAZOSIN HYDROCHLORIDE 5 MG/1
10 CAPSULE ORAL NIGHTLY
COMMUNITY
End: 2024-11-07

## 2024-11-07 RX ORDER — PRAZOSIN HYDROCHLORIDE 5 MG/1
5 CAPSULE ORAL NIGHTLY
COMMUNITY
End: 2024-11-07 | Stop reason: SINTOL

## 2024-11-07 RX ORDER — LISINOPRIL 40 MG/1
40 TABLET ORAL DAILY
Qty: 90 TABLET | Refills: 3 | Status: SHIPPED | OUTPATIENT
Start: 2024-11-07

## 2024-11-07 NOTE — PROGRESS NOTES
OFFICE VISIT  NOTE  [facility] CARDIOLOGY      Name: Christiano Xiao    Date: 2024  MRN:  [id number]  :  1975      REFERRING/PRIMARY PROVIDER:  Christin Chacon MD     Chief Complaint   Patient presents with    PAF (paroxysmal atrial fibrillation)     HPI: Christiano Xiao is a 49 y.o. male who presents today for follow up of history of CVA.  2023 vertigo and tunnel vision, MRI showed bilateral embolic strokes left cerebellum and right occipital region carotid duplex showed minimal plaque bilaterally.  He reports a history of paroxysmal atrial fibrillation previously followed by Dr. Guerrero.  Follows with Dr. Garcia of neurology, who recommended loop recorder plantation.  Echo 7/3/2023 showed EF 50 to 55% with mild basal asymmetric hypertrophy, grade 1 diastolic dysfunction and negative saline study.  [facility] loop monitor placed 2023. He was also started on Xarelto empirically last visit. Doing well since last visit, denies chest pain, dyspnea, has occasional brief palpitations. He has noted increased bleeding gums while brushing teeth since starting Xarelto. He also reports severe drowsiness with taking Prazosin, does not take it very often due to this effect.     ROS:Pertinent positives as listed in the HPI.  All other systems reviewed and negative.    Past Medical History:   Diagnosis Date    Atrial fibrillation     CVA (cerebral vascular accident) 2023    HTN (hypertension) 2023    Stroke 2023       Past Surgical History:   Procedure Laterality Date    HAND SURGERY      VASECTOMY         Social History     Socioeconomic History    Marital status: Single   Tobacco Use    Smoking status: Every Day     Types: Electronic Cigarette     Passive exposure: Yes    Smokeless tobacco: Never    Tobacco comments:     ELECTRONIC CIG   Vaping Use    Vaping status: Some Days   Substance and Sexual Activity    Alcohol use: Yes     Alcohol/week: 3.0 standard drinks  "of alcohol     Types: 3 Cans of beer per week     Comment: SOCIAL 5-6 DRINKS PER WEEK    Drug use: No    Sexual activity: Yes     Partners: Female     Birth control/protection: Vasectomy       Family History   Problem Relation Age of Onset    Cancer Mother     Hypertension Father     Stroke Father         No Known Allergies    Current Outpatient Medications   Medication Instructions    amLODIPine (NORVASC) 10 MG tablet amlodipine 10 mg tablet   1 po QDay    atorvastatin (LIPITOR) 20 MG tablet 1 tablet, Daily    Coreg CR 40 mg, Daily    lisinopril (PRINIVIL,ZESTRIL) 40 mg, Oral, Daily    rivaroxaban (XARELTO) 20 mg, Oral, Daily       Vitals:    11/07/24 1404   BP: 136/74   BP Location: Left arm   Patient Position: Sitting   Cuff Size: Adult   Pulse: 56   SpO2: 99%   Weight: 117 kg (258 lb)   Height: 193 cm (76\")     Body mass index is 31.4 kg/m².    PHYSICAL EXAM:    General Appearance:   well developed  well nourished  Neck:  thyroid not enlarged  supple  Respiratory:  no respiratory distress  normal breath sounds  no rales  Cardiovascular:  no jugular venous distention  regular rhythm  apical impulse normal  S1 normal, S2 normal  no S3, no S4   no murmur  no rub, no thrill  lower extremity edema: none    Skin:   warm, dry    RESULTS:   Procedures    Results for orders placed during the hospital encounter of 07/03/23    Adult Transthoracic Echo Complete W/ Cont if Necessary Per Protocol    Interpretation Summary    Left ventricular systolic function is normal. Estimated left ventricular EF = 52% Left ventricular ejection fraction appears to be 51 - 55%.    Left ventricular wall thickness is consistent with mild basal asymmetric hypertrophy.    Left ventricular diastolic function is consistent with (grade I) impaired relaxation.    Saline test results are negative.        Labs:  Lab Results   Component Value Date    CHOL 205 (H) 06/21/2023    TRIG 119 06/21/2023    HDL 51 06/21/2023     (H) 06/21/2023    AST " "19 01/27/2023    ALT 22 01/27/2023     Lab Results   Component Value Date    HGBA1C 5.40 06/21/2023     Creatinine   Date Value Ref Range Status   01/27/2023 1.32 (H) 0.76 - 1.27 mg/dL Final     No results found for: \"EGFRIFNONA\"      ASSESSMENT:  Problem List Items Addressed This Visit       PAF (paroxysmal atrial fibrillation) - Primary    HTN (hypertension)    Relevant Medications    lisinopril (PRINIVIL,ZESTRIL) 40 MG tablet       PLAN:  1.  Bilateral CVA:  [facility] [facility] loop monitor implanted 11/2023, remote monitoring with no Afib   Continue Xarelto 20 mg daily and statin  Continue remote monitoring      2.  Hypertension:  Goal blood pressure less than 130/80  Stop Prazosin due to side effects, increase Lisinopril to 40mg daily   Follow up with PCP and check home BPs regularly     Low-sodium diet and exercise also recommended.     3.  Hyperlipidemia:  Continue high intensity statin therapy with follow-up CMP and lipid         Advance Care Planning   ACP discussion was held with the patient during this visit. Patient does not have an advance directive, declines further assistance.          Follow-up   Return in about 9 months (around 8/7/2025) for *SJM .      Scribed for Omar Meredith MD by Carrol Rg PA-C. 11/8/2024  10:45 EST    I,Omar Meredith M.D., personally performed the services described in this documentation as scribed by the above named individual in my presence, and it is both accurate and complete.    Omar Meredith MD, [facility], [facility] Cardiology  11/08/24  10:45 EST        "

## 2025-07-11 PROCEDURE — 93298 REM INTERROG DEV EVAL SCRMS: CPT | Performed by: INTERNAL MEDICINE

## 2025-07-18 LAB
MDC_IDC_PG_IMPLANT_DTM: NORMAL
MDC_IDC_PG_MFG: NORMAL
MDC_IDC_PG_MODEL: NORMAL
MDC_IDC_PG_SERIAL: NORMAL
MDC_IDC_PG_TYPE: NORMAL
MDC_IDC_SESS_DTM: NORMAL
MDC_IDC_SESS_TYPE: NORMAL
